# Patient Record
Sex: FEMALE | Race: WHITE | Employment: PART TIME | URBAN - METROPOLITAN AREA
[De-identification: names, ages, dates, MRNs, and addresses within clinical notes are randomized per-mention and may not be internally consistent; named-entity substitution may affect disease eponyms.]

---

## 2020-11-04 ENCOUNTER — HOSPITAL ENCOUNTER (OUTPATIENT)
Dept: MAMMOGRAPHY | Age: 50
Discharge: HOME | End: 2020-11-04
Payer: COMMERCIAL

## 2023-02-28 ENCOUNTER — OFFICE VISIT (OUTPATIENT)
Dept: PULMONOLOGY | Age: 53
End: 2023-02-28
Payer: MEDICAID

## 2023-02-28 VITALS
HEART RATE: 77 BPM | OXYGEN SATURATION: 99 % | HEIGHT: 62 IN | SYSTOLIC BLOOD PRESSURE: 128 MMHG | DIASTOLIC BLOOD PRESSURE: 80 MMHG | WEIGHT: 186 LBS | BODY MASS INDEX: 34.23 KG/M2

## 2023-02-28 DIAGNOSIS — G47.00 INSOMNIA, UNSPECIFIED TYPE: ICD-10-CM

## 2023-02-28 DIAGNOSIS — R06.81 WITNESSED EPISODE OF APNEA: ICD-10-CM

## 2023-02-28 DIAGNOSIS — E66.9 OBESITY (BMI 30-39.9): ICD-10-CM

## 2023-02-28 DIAGNOSIS — G47.10 HYPERSOMNIA: Primary | ICD-10-CM

## 2023-02-28 PROCEDURE — 99204 OFFICE O/P NEW MOD 45 MIN: CPT | Performed by: STUDENT IN AN ORGANIZED HEALTH CARE EDUCATION/TRAINING PROGRAM

## 2023-02-28 RX ORDER — ATENOLOL 100 MG/1
100 TABLET ORAL DAILY
COMMUNITY
Start: 2020-01-16

## 2023-02-28 ASSESSMENT — SLEEP AND FATIGUE QUESTIONNAIRES
HOW LIKELY ARE YOU TO NOD OFF OR FALL ASLEEP WHILE SITTING AND READING: 1
HOW LIKELY ARE YOU TO NOD OFF OR FALL ASLEEP WHILE SITTING INACTIVE IN A PUBLIC PLACE: 2
HOW LIKELY ARE YOU TO NOD OFF OR FALL ASLEEP IN A CAR, WHILE STOPPED FOR A FEW MINUTES IN TRAFFIC: 0
HOW LIKELY ARE YOU TO NOD OFF OR FALL ASLEEP WHILE LYING DOWN TO REST IN THE AFTERNOON WHEN CIRCUMSTANCES PERMIT: 3
ESS TOTAL SCORE: 12
HOW LIKELY ARE YOU TO NOD OFF OR FALL ASLEEP WHILE SITTING AND TALKING TO SOMEONE: 0
HOW LIKELY ARE YOU TO NOD OFF OR FALL ASLEEP WHILE WATCHING TV: 0
HOW LIKELY ARE YOU TO NOD OFF OR FALL ASLEEP WHILE SITTING QUIETLY AFTER LUNCH WITHOUT ALCOHOL: 3
NECK CIRCUMFERENCE (INCHES): 16
HOW LIKELY ARE YOU TO NOD OFF OR FALL ASLEEP WHEN YOU ARE A PASSENGER IN A CAR FOR AN HOUR WITHOUT A BREAK: 3

## 2023-02-28 NOTE — PATIENT INSTRUCTIONS
Patient information on the evaluation procedure for sleep apnea: You will have a sleep study scheduled to see if you have obstructive sleep apnea. The sleep office will call you with the results a week after the study. If the study shows that you have obstructive sleep apnea, you will be told of the next step in your care. Commonly the recommended treatment is CPAP Therapy. If you agree to this therapy and you receive your CPAP before your next appointment, please bring it with you to your follow-up appointment. Patients who have obstructive sleep apnea on the sleep study and have chosen to try CPAP:  A home equipment company will contact you to set you up with a CPAP machine and fit you for a mask. Please bring your CPAP, the mask and all supplies including the electrical cord with you to all your follow up appointments with the sleep physician    Please keep trying to use your CPAP until you have seen in the sleep office in follow up as a lot of the problems patients have with CPAP can be addressed and corrected by your sleep provider. Sleep center contact information:  [facility]: [phone]) [facility]: [phone]         Please bring your CPAP equipment and smart card to all your sleep appointments. What are the risk factors for Obstructive Sleep Apnea (ROLAND)? Obesity  Snoring  Daytime sleepiness  Increasing age  Male gender  Taking sedating medications  Alcohol use  Hypertension  Stroke  Diabetes  Smoking     What is ROLAND? People with ROLAND experience recurrent episodes during sleep when their throat closes or significantly narrows and they cannot inhale air into their lungs.  This happens because the muscles that normally hold the throat open during wakefulness relax during sleep and allow it to narrow        When the muscles relax too much, trying to inhale can cause the throat to completely close and air cannot pass at all for at least 10 seconds. This is an obstructive apnea. An obstructive hypopnea occurs when the throat is partially closed for at least 10 seconds and airflow is decreased so much that oxygen in the blood starts to be fall. ROLAND is measured by how many apneas and hypopneas we have per hour. This is called an Apnea Hypopnea Index (AHI). It is considered excessive to have more than 5 apneas or hypopneas per hour as this can be extremely disruptive to sleep and have significant effects on our health and wellbeing. How does ROLAND affect me? ROLAND goes beyond affecting just our quality of sleep. It interferes with many other systems of our body. Increase in blood pressure  Worsened control of diabetes  Daytime sleepiness and fatigue  Irritability  Difficulty concentrating or remembering facts  Difficulty losing weight  Swelling in the legs  Frequent awakenings to urinate  Increased risk of stroke  Increased risk of irregular heart rhythm  Increased risk for cardiovascular disease  Decreased sexual drive  Morning headaches  Increased risk of motor vehicle accident    How is ROLAND treated? The first line of treatment for ORLAND is continuous positive airway pressure (CPAP). A CPAP device provides air though a mask that fits over your nose or mouth and nose. The CPAP provides enough airflow to prevent the airway from collapsing when sleeping. This air can be humidified for comfort. Newer masks fit comfortably over the nasal opening rather than over the nose. It is important that CPAP is used regularly each night for optimal results. Patients should notice improvement in sleepiness within the first week or two. Several second line therapies exist for ROLAND. Behavioral therapy for ROLAND includes weight loss and positional therapy which is avoidance of sleeping on ones back. Significant improvement in ROLAND can occur with as little as 10% weight loss.      Dental devices that hold the lower jaw or tongue forward during sleep can also relieve ROLAND. These devices are not always as effective as CPAP but are preferred by some patients. Surgical procedures are also options, but is often hard to predict how effective a surgical treatment will be in reducing or eliminating sleep apnea. Additional Information  American Academy of Sleep Medicine (www.aasmnet. org)    [address] (www.sleepfoundation. org)    American Sleep Apnea Association ([employer]eb[employer] Safe. org)    National Heart, Lung, and [employer] [place] (www.nhlbi.nih.gov)    [employer] (www.[employer]etic[employer].com/patients)                                                                       Weight Loss      Weight Loss is an important part of treating obstructive sleep apnea. Being at a healthy weight is improtant to prevent and/or facilitate treatment of chronic conditions such as heart disease and diabetes in addition to obstructive sleep apnea. This is optimally achieved through a healthy diet and exercise program that can be maintained long term. Drowsy Driving Tips    These suggestions will help prevent you from the risk of drowsy driving. 1.  If you feel tired or drowsy do not drive. Sleepiness is a major cause of motor vehicle accidents and accounts for 40% of all fatal crashes reported on the [address]. No matter how much you think you can control sleepiness, you can't.    2. Ensure you follow your doctor's advice about the treatment for your sleep disorder. For example, if you have sleep apnea and use CPAP, ensure you use it fully the night before your trip. 3. Get a good night's sleep before driving. Do not reduce your sleep time if you plan a long drive the next day. Get to bed early and do not stay up late packing. 4. Avoid alcohol both the night before your trip and the during your trip. Alcohol will disrupt sleep and make you more tired the next day.   Sleepiness and alcohol are additive in increasing impairment of your driving ability. 5. Avoid any sedative medications, including sedative antihistamines that are often contained in cold or allergy medications, the night before you drive as they may have long lasting effects the next day. 6. Travel during non-sleeping hours. Accidents due to sleepiness are more common during the nighttime hours. 7. If sleepy, stop and rest.  Drink coffee, walk around or have a brief nap in your car if you are sleepy. Have a 10-15 minute break after every 2 hours of driving. 8. Drive with a . Share the driving. Relax in the back seat until it is your time to share the driving again.

## 2023-02-28 NOTE — PROGRESS NOTES
Chief Complaint   Patient presents with    Snoring     Not able to sleep on back , stops breathing so she sleeps on side     Insomnia       Taco Pulido is a 47 y.o. female who comes in for sleep evaluation. Her complaints include sleep deprivation, fatigue and snoring. Onset of symptoms was a year ago. Pertinent PMHx includes: hypertension. She goes to bed at 11pm. She falls asleep in variable times, sometimes she cannot sleep for several hours. She awakens at 8am.    She awakens at least 2 times per night. She does not use medication for sleep. She does often take daytime naps. She describes the symptoms as daytime sleepiness, fatigue, snoring, witnessed apneas, difficulty falling asleep, difficulty staying asleep, waking up too early, and waking up in the middle of the night gasping for air. She has not fallen asleep while driving. She does not have symptoms that fulfill the criteria for restless legs syndrome. Previous evaluation and treatment has included: none. 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 a theatre or a meeting) 2   As a passenger in a car for an hour without a break 3   Lying down to rest in the afternoon when circumstances permit 3   Sitting and talking to someone 0   Sitting quietly after a lunch without alcohol 3   In a car, while stopped for a few minutes in traffic 0   Maxie Sleepiness Score 12   Neck circumference (Inches) 16     Insomnia Severity Index (COV) 2/28/2023   Difficulty falling asleep 2   Difficulty staying asleep 3   Problems waking up too early 0   How satisfied/dissatisfied are you with your CURRENT sleep pattern?  2   How NOTICEABLE to others do you think your sleep problem is in terms of impairing the quality of life? 2   How WORRIED/DISTRESSED are you about your current sleep problem? 1   To what extent do you believe your sleep problem INTERFERES with your daily functioning (e.g. fatigue, mood,etc.) currently? 3   Insomnia Severity Index Score 13         Physical Exam  Vitals:    02/28/23 1456   BP: 128/80   Pulse: 77   SpO2: 99%     Additional Measurements    02/28/23 1458   Neck circumference (Inches): 16      General: alert and oriented, no apparent distress, obese   HEENT:  Tongue: Ridging:No  Overjet: No  Retrognathia: No  Tonsils: No  Uvula: normal size  Chest:  Auscultation: CTA, crackles, no; wheezing, no; nl excursion bilaterally  Heart:  RRR: No murmurs, rubs or gallops  Normal PMI  Skin: warm, no rashes    Ext:  Edema: No  Pulses: 2+ equal and bilateral  Neuro: alert and oriented     Mallampati Airway Classification:   []1 []2 []3 [x]4        Assessment and Plan  The primary encounter diagnosis was Hypersomnia. Diagnoses of Insomnia, unspecified type, Witnessed episode of apnea, and Obesity (BMI 30-39. 9) were also pertinent to this visit. Orders Placed This Encounter   Procedures    Baseline Diagnostic Sleep Study          Jeanne Aguiar is a 47 y.o. female with pertinent PMHx of hypertension, presenting with symptoms of disrupted sleep. She has risk factors for sleep disordered breathing including snoring, witnessed apneas, waking up gasping for air, obesity, hx of hypertension, high ESS score. I will order an overnight polysomnogram to further evaluate this. We discussed treatment options and she is willing to consider CPAP treatment. If the study is positive for obstructive sleep apnea, we will therefore proceed with auto CPAP unless in lab PAP titration is indicated based on the sleep study.    She understands that untreated ROLAND is associated with heart failure, atrial fibrillation, stroke, HTN, Alzheimer's and impaired glucose tolerance. If study is negative for ROLAND, we will consider treatment of insomnia. She should avoid respiratory suppressants as these can worsen sleep disordered breathing. She should never drive if drowsy and should pull over at a safe place if she becomes drowsy while driving. A handout on drowsy driving tips was given to the patient. Obesity: Weight loss is associated with improvement in sleep disordered breathing. Nabila Huston should exercise regularly and watch her diet. Return if symptoms worsen or fail to improve. Will schedule patient for follow-up if test is positive for ROLAND.     Sharath Brewer MD  3:53 PM

## 2023-03-01 ENCOUNTER — TELEPHONE (OUTPATIENT)
Dept: SLEEP CENTER | Age: 53
End: 2023-03-01

## 2023-04-20 ENCOUNTER — TELEPHONE (OUTPATIENT)
Dept: PULMONOLOGY | Age: 53
End: 2023-04-20

## 2023-04-21 PROBLEM — G47.00 INSOMNIA: Status: ACTIVE | Noted: 2023-04-21

## 2023-04-21 NOTE — PROGRESS NOTES
undersigned, certify that the above prescribed supplies are medically necessary for this patients wellbeing. In my opinion, the supplies are both reasonable and necessary in reference to accepted standards of medicalpractice in treatment of this patients condition.     Mira Harris MD     NPI: [id number]       Order Signed Date: 04/20/23    Electronically signed by Mira Harris MD on 4/20/2023 at 9:23 PM.

## 2023-04-21 NOTE — TELEPHONE ENCOUNTER
Called and informed patient of study results per provider message. Patient verbalized understanding, orders sent to equipment company. Patient scheduled for 31 to 90 day follow up.

## 2023-04-21 NOTE — TELEPHONE ENCOUNTER
Please inform patient that her sleep study showed mild obstructive sleep apnea and that she stopped breathing or his breathing was inadequate about 7 times for every hour of sleep. Her blood oxygen also dropped as low as 86% during the night. If she agrees I will order a CPAP via a durable medical equipment company [employer]) and they will reach out to her to keep her updated on the process. This will be the company that is going to work with her insurance and provide her the CPAP device, along with replacing the mask and other supplies going forward. Please let them also know that if they are not satisfied with their service, they can change DME companies at any time. If they have any questions, they can let you know or message me via [employer]. If patient agrees with plan, please route the PAP order to DME company (order already completed on a separate order encounter). Patient should be scheduled for 2 month follow up.     Thanks  Chana Pepe MD

## 2023-06-29 ENCOUNTER — TELEPHONE (OUTPATIENT)
Dept: PULMONOLOGY | Age: 53
End: 2023-06-29

## 2023-06-29 DIAGNOSIS — G47.33 OSA (OBSTRUCTIVE SLEEP APNEA): Primary | ICD-10-CM

## 2023-07-26 ENCOUNTER — HOSPITAL ENCOUNTER (OUTPATIENT)
Dept: SLEEP CENTER | Age: 53
Discharge: HOME | End: 2023-07-26
Payer: COMMERCIAL

## 2023-07-26 DIAGNOSIS — G47.33 OSA (OBSTRUCTIVE SLEEP APNEA): ICD-10-CM

## 2023-07-26 PROCEDURE — 95811 POLYSOM 6/>YRS CPAP 4/> PARM: CPT

## 2023-07-27 ENCOUNTER — TELEPHONE (OUTPATIENT)
Dept: PULMONOLOGY | Age: 53
End: 2023-07-27

## 2023-07-28 PROBLEM — G47.33 OSA (OBSTRUCTIVE SLEEP APNEA): Status: ACTIVE | Noted: 2023-07-28

## 2023-07-28 NOTE — PROGRESS NOTES
Diagnosis: [x] ROLAND  (G47.33) [] CSA (G47.31) []  Other:__________________   Length of Need: [] 13 months [x]  99 Months                                          []  Other:__________________   Machine (NISHA): [] Respironics Auto (with modem for remote monitoring)       [] Other:____________________    [x]  ResMed Auto (with modem for remote monitoring)    [x]  CPAP () [] Bilevel ()   Mode: Mode:   [x] Auto [] Fixed [] Auto [] Spontaneous   Pmin:______5___cmH2O      Pmax:____20_____cmH2O   P:_________cmH2O    EPAPmin:__________cmH2O IPAP:__________cmH2O     IPAPmax:__________cmH2O EPAP:__________cmH2O     PSmin:_______  PSmax:_______       (ResMed) PS:_________     Flex/EPR - 3 full time                          Ramp time: 30 min Flex/EPR - 3 full time                 Ramp time: 30 min   Ramp Pressure:______4_____cmH2O Ramp Pressure:____________cmH2O         Humidifier: [x] Heated ()                                               [] Passive     [x] Water chamber replacement ()/ 1 per 6 months   Mask:   [x] Nasal () /1 per 3 months [x] Full Face () /1 per 3 months   [x] Patient choice -Size and fit mask [x] Patient Choice - Size and fit mask   [] Dispense:__________________________________ [x] Dispense S/M Lance FF   [x] Headgear () / 1 per 3 months [x] Headgear () / 1 per 3 months   [x] Replacement Nasal Cushion ()/2 per month [x] Interface Replacement ()/1 per month   [x] Replacement Nasal Pillows ()/2 per month       Tubing: [x] Heated ()/1 per 3 months                           [] Standard ()/1 per 3 months  [] Other:____________________   Filters: [x] Non-disposable ()/1 per 6 months                 [x] Ultra-Fine, Disposable ()/2 per month     Miscellaneous: [] Chin Strap ()/ 1 per 6months      [] Other:__________________________________         Start Order Date: 07/27/23    MEDICAL JUSTIFICATION:  I, the undersigned, certify that the

## 2023-07-28 NOTE — TELEPHONE ENCOUNTER
Please inform patient based on the recent titration study, we will proceed with treating her obstructive sleep apnea with auto CPAP. I will order a PAP device via a durable medical equipment company of their choice (if no preference, we will go with [address] [place] [address]) and they will reach out to keep her updated on the process. This will be the company that is going to work with her insurance and provide her the PAP device, along with replacing the mask and other supplies going forward. Please let them also know that if they are not satisfied with their service, they can change DME companies at any time. If they have any questions, they can let you know or message me via [employer]. If patient agrees with plan, please route the PAP order to DME company (order already completed on a separate order encounter). Patient should be scheduled for 60-90 days follow up.     Thanks  Anjum De La Paz MD

## 2023-07-28 NOTE — TELEPHONE ENCOUNTER
Called and informed patient of study results per provider message. Patient verbalized understanding, orders sent to equipment company. Patient scheduled for 60 to 90 day follow up.

## 2023-11-03 ENCOUNTER — TELEPHONE (OUTPATIENT)
Dept: PULMONOLOGY | Age: 53
End: 2023-11-03

## 2023-11-03 NOTE — TELEPHONE ENCOUNTER
Pt canceled [facility] for 11/6, states she has been trying to get a hold of her insurance company to see if they will cover a CPAP/cost, and she will get rescheduled when she hears back from them.